# Patient Record
Sex: MALE | URBAN - METROPOLITAN AREA
[De-identification: names, ages, dates, MRNs, and addresses within clinical notes are randomized per-mention and may not be internally consistent; named-entity substitution may affect disease eponyms.]

---

## 2021-01-01 ENCOUNTER — TELEPHONE ENCOUNTER (OUTPATIENT)
Dept: URBAN - METROPOLITAN AREA CLINIC 92 | Facility: CLINIC | Age: 36
End: 2021-01-01

## 2021-01-01 ENCOUNTER — TELEPHONE ENCOUNTER (OUTPATIENT)
Dept: URBAN - METROPOLITAN AREA CLINIC 128 | Facility: CLINIC | Age: 36
End: 2021-01-01

## 2021-01-01 ENCOUNTER — DASHBOARD ENCOUNTERS (OUTPATIENT)
Age: 36
End: 2021-01-01

## 2021-01-01 ENCOUNTER — OFFICE VISIT (OUTPATIENT)
Dept: URBAN - METROPOLITAN AREA SURGERY CENTER 31 | Facility: SURGERY CENTER | Age: 36
End: 2021-01-01
Payer: COMMERCIAL

## 2021-01-01 ENCOUNTER — OFFICE VISIT (OUTPATIENT)
Dept: URBAN - METROPOLITAN AREA CLINIC 128 | Facility: CLINIC | Age: 36
End: 2021-01-01
Payer: COMMERCIAL

## 2021-01-01 DIAGNOSIS — R93.89 ABNORMAL CT SCAN: ICD-10-CM

## 2021-01-01 DIAGNOSIS — D12.4 ADENOMA OF DESCENDING COLON: ICD-10-CM

## 2021-01-01 DIAGNOSIS — Z80.0 FAMILY HISTORY OF COLON CANCER IN FATHER: ICD-10-CM

## 2021-01-01 DIAGNOSIS — D12.5 ADENOMA OF SIGMOID COLON: ICD-10-CM

## 2021-01-01 DIAGNOSIS — R93.3 ABN FINDINGS-GI TRACT: ICD-10-CM

## 2021-01-01 DIAGNOSIS — Z83.71 FAMILY HISTORY OF COLONIC POLYPS: ICD-10-CM

## 2021-01-01 DIAGNOSIS — D12.3 ADENOMA OF TRANSVERSE COLON: ICD-10-CM

## 2021-01-01 PROCEDURE — 99203 OFFICE O/P NEW LOW 30 MIN: CPT | Performed by: INTERNAL MEDICINE

## 2021-01-01 PROCEDURE — G8907 PT DOC NO EVENTS ON DISCHARG: HCPCS | Performed by: INTERNAL MEDICINE

## 2021-01-01 PROCEDURE — 45385 COLONOSCOPY W/LESION REMOVAL: CPT | Performed by: INTERNAL MEDICINE

## 2021-01-01 RX ORDER — LINACLOTIDE 72 UG/1
1 CAPSULE AT LEAST 30 MINUTES BEFORE THE FIRST MEAL OF THE DAY ON AN EMPTY STOMACH CAPSULE, GELATIN COATED ORAL ONCE A DAY
Qty: 30 | Refills: 5 | OUTPATIENT
Start: 2021-01-01 | End: 2021-01-01

## 2021-01-01 RX ORDER — POLYETHYLENE GLYOCOL 3350, SODIUM CHLORIDE, SODIUM BICARBONATE AND POTASSIUM CHLORIDE 420; 11.2; 5.72; 1.48 G/4L; G/4L; G/4L; G/4L
SPLIT DOSE REGIMEN: TAKE ONE HALF OF PREP PO AT 5PM AND TAKE THE OTHER HALF OF PREP PO AT 10PM POWDER, FOR SOLUTION NASOGASTRIC; ORAL
Qty: 1 BOTTLE | Refills: 0 | OUTPATIENT
Start: 2021-01-01 | End: 2021-08-31

## 2021-08-10 PROBLEM — 35298007 CONSTIPATION BY DELAYED COLONIC TRANSIT: Status: ACTIVE | Noted: 2021-01-01

## 2021-08-10 NOTE — HPI-TODAY'S VISIT:
The patient went to [facility] on 6/29/21 for chest pain following his rotator cuff surgery. He states he was having pain in his abdomen that radiated up into his esophagus.  He also states it was hurting when he took a deep breath.  His white blood cell count was 15.9.  His lipase and LFTs were normal.  His CTA was negative for PE.  His abdomen and pelvic CT scan revealed low-attenuation foci in the kidneys too small to characterize but likely cysts.  Short segment abnormality of distal sigmoid colon possibly from spasm or tumor was noted on CT and a colonoscopy was recommended.  He is here to schedule this today.  He has never had a colonoscopy.  His father had colon cancer and colon polyps.  He offers no upper or lower GI symptoms at the moment.

## 2021-08-11 PROBLEM — 129679001: Status: ACTIVE | Noted: 2021-01-01

## 2021-08-30 PROBLEM — 428283002: Status: ACTIVE | Noted: 2021-01-01
